# Patient Record
Sex: FEMALE | Race: WHITE | Employment: OTHER | URBAN - METROPOLITAN AREA
[De-identification: names, ages, dates, MRNs, and addresses within clinical notes are randomized per-mention and may not be internally consistent; named-entity substitution may affect disease eponyms.]

---

## 2023-09-12 NOTE — H&P
ASSESSMENT:  1. Closed fracture of distal end of left radius, unspecified fracture morphology, initial encounter          There is no problem list on file for this patient. PLAN:  Treatment Plan:  I recommend operative management. Open reduction internal fixation under regional anesthesia. We discussed reasonable expectations. We discussed usual postop course. She has given informed consent to proceed. Orders Placed This Encounter    Surgical Posting Sheet      Follow-up: Return for first postoperative visit. HISTORY OF PRESENT ILLNESS:  Chief Complaint: Injury of the Left Hand   Age: 80 y.o. Sex: female   History of present illness: Jigar Boyer  is a pleasant 80 y.o. female who presents today for evaluation of the left wrist.  She had a fall September 7th. She was found to have a widely displaced intra-articular fracture of the distal radius. She was seen in [place]. She had a closed reduction performed. She was placed into a splint. Past medical history, past surgical history, medications, allergies, social history, and review of systems have been reviewed by me. No current facility-administered medications on file prior to encounter. No current outpatient medications on file prior to encounter. Not on File    No past medical history on file.     Social History     Socioeconomic History    Marital status: Not on file     Spouse name: Not on file    Number of children: Not on file    Years of education: Not on file    Highest education level: Not on file   Occupational History    Not on file   Tobacco Use    Smoking status: Not on file    Smokeless tobacco: Not on file   Substance and Sexual Activity    Alcohol use: Not on file    Drug use: Not on file    Sexual activity: Not on file   Other Topics Concern    Not on file   Social History Narrative    Not on file     Social Determinants of Health     Financial Resource Strain: Not on file   Food Insecurity: Not on file   Transportation Needs: Not on file   Physical Activity: Not on file   Stress: Not on file   Social Connections: Not on file   Intimate Partner Violence: Not on file   Housing Stability: Not on file            Review of Systems   9/11/2023     Constitutional: Unexplained: Negative  Genitourinary: Frequent Urination: Negative  HEENT: Vision Loss: Negative  Neurological: Memory Loss: Negative  Integumentary: Rash: Negative  Cardiovascular: Palpatations: Negative  Hematologic: Bruises/Bleeds Easily: Positive  Gastrointestinal: Constipation: Positive  Immunological: Seasonal Allergies: Positive  Musculoskeletal: Joint Pain: Positive         OBJECTIVE:  Constitutional:  No acute distress. Pleasant and cooperative with exam.     Musculoskeletal       She has moderate swelling of the hand. She is in a well fitted splint. Has intact sensibility. Full digital motion. IMAGING / STUDIES:           No imaging obtained       I independently reviewed her x-ray images. She has a comminuted displaced intra-articular fracture of the distal radius.

## 2023-09-13 ENCOUNTER — ANESTHESIA (OUTPATIENT)
Facility: HOSPITAL | Age: 68
End: 2023-09-13
Payer: MEDICARE

## 2023-09-13 ENCOUNTER — ANESTHESIA EVENT (OUTPATIENT)
Facility: HOSPITAL | Age: 68
End: 2023-09-13
Payer: MEDICARE

## 2023-09-13 ENCOUNTER — HOSPITAL ENCOUNTER (OUTPATIENT)
Facility: HOSPITAL | Age: 68
Setting detail: OUTPATIENT SURGERY
Discharge: HOME | End: 2023-09-13
Attending: ORTHOPAEDIC SURGERY | Admitting: ORTHOPAEDIC SURGERY
Payer: MEDICARE

## 2023-09-13 VITALS
BODY MASS INDEX: 21.71 KG/M2 | TEMPERATURE: 97.6 F | DIASTOLIC BLOOD PRESSURE: 71 MMHG | SYSTOLIC BLOOD PRESSURE: 137 MMHG | WEIGHT: 115 LBS | HEART RATE: 79 BPM | OXYGEN SATURATION: 100 % | RESPIRATION RATE: 11 BRPM | HEIGHT: 61 IN

## 2023-09-13 DIAGNOSIS — S52.502A CLOSED FRACTURE OF DISTAL END OF LEFT RADIUS, UNSPECIFIED FRACTURE MORPHOLOGY, INITIAL ENCOUNTER: Primary | ICD-10-CM

## 2023-09-13 PROCEDURE — 2709999900 HC NON-CHARGEABLE SUPPLY: Performed by: ORTHOPAEDIC SURGERY

## 2023-09-13 PROCEDURE — 6360000002 HC RX W HCPCS: Performed by: ANESTHESIOLOGY

## 2023-09-13 PROCEDURE — 6360000002 HC RX W HCPCS: Performed by: NURSE ANESTHETIST, CERTIFIED REGISTERED

## 2023-09-13 PROCEDURE — 2500000003 HC RX 250 WO HCPCS: Performed by: NURSE ANESTHETIST, CERTIFIED REGISTERED

## 2023-09-13 PROCEDURE — C1713 ANCHOR/SCREW BN/BN,TIS/BN: HCPCS | Performed by: ORTHOPAEDIC SURGERY

## 2023-09-13 PROCEDURE — 6360000002 HC RX W HCPCS

## 2023-09-13 PROCEDURE — 7100000001 HC PACU RECOVERY - ADDTL 15 MIN: Performed by: ORTHOPAEDIC SURGERY

## 2023-09-13 PROCEDURE — 2580000003 HC RX 258: Performed by: NURSE ANESTHETIST, CERTIFIED REGISTERED

## 2023-09-13 PROCEDURE — 3600000004 HC SURGERY LEVEL 4 BASE: Performed by: ORTHOPAEDIC SURGERY

## 2023-09-13 PROCEDURE — 3700000000 HC ANESTHESIA ATTENDED CARE: Performed by: ORTHOPAEDIC SURGERY

## 2023-09-13 PROCEDURE — 2720000010 HC SURG SUPPLY STERILE: Performed by: ORTHOPAEDIC SURGERY

## 2023-09-13 PROCEDURE — 7100000000 HC PACU RECOVERY - FIRST 15 MIN: Performed by: ORTHOPAEDIC SURGERY

## 2023-09-13 PROCEDURE — 3600000014 HC SURGERY LEVEL 4 ADDTL 15MIN: Performed by: ORTHOPAEDIC SURGERY

## 2023-09-13 PROCEDURE — 2580000003 HC RX 258

## 2023-09-13 PROCEDURE — 64415 NJX AA&/STRD BRCH PLXS IMG: CPT | Performed by: ANESTHESIOLOGY

## 2023-09-13 PROCEDURE — 6360000002 HC RX W HCPCS: Performed by: ORTHOPAEDIC SURGERY

## 2023-09-13 PROCEDURE — 3700000001 HC ADD 15 MINUTES (ANESTHESIA): Performed by: ORTHOPAEDIC SURGERY

## 2023-09-13 DEVICE — PEG BONE FXTN L20MM D2.2MM DST VOLAR RDL SMOOTH LOK CRSSLCK: Type: IMPLANTABLE DEVICE | Site: ARM | Status: FUNCTIONAL

## 2023-09-13 DEVICE — SCREW BNE L12MM DIA2.7MM DST VOLAR RAD LOK FULL THRD SQ DRV: Type: IMPLANTABLE DEVICE | Site: ARM | Status: FUNCTIONAL

## 2023-09-13 DEVICE — SCREW BNE L14MM DIA2.7MM DST VOLAR RAD NONLOCKING FULL THRD: Type: IMPLANTABLE DEVICE | Site: ARM | Status: FUNCTIONAL

## 2023-09-13 DEVICE — PLATE BNE W24XL43MM 12 H L DST RAD TI LOK COMPR LO PROF NAR: Type: IMPLANTABLE DEVICE | Site: ARM | Status: FUNCTIONAL

## 2023-09-13 DEVICE — SCREW BNE L12MM DIA2.7MM DST RAD NONLOCKING FULL THRD SQ: Type: IMPLANTABLE DEVICE | Site: ARM | Status: FUNCTIONAL

## 2023-09-13 DEVICE — PEG BNE FIX L16MM DIA2.2MM DST VOLAR RAD SMOOTH LOK FOR DVR: Type: IMPLANTABLE DEVICE | Site: ARM | Status: FUNCTIONAL

## 2023-09-13 DEVICE — K WIRE FIX DIA1.6MM S STL FOR DST VOLAR PLATING SYS: Type: IMPLANTABLE DEVICE | Status: FUNCTIONAL

## 2023-09-13 DEVICE — SCREW BNE FIX L18MM DIA2.2MM DST VOLAR RAD SMOOTH LOK PEG: Type: IMPLANTABLE DEVICE | Site: ARM | Status: FUNCTIONAL

## 2023-09-13 RX ORDER — CETIRIZINE HYDROCHLORIDE 10 MG/1
10 TABLET ORAL DAILY
COMMUNITY

## 2023-09-13 RX ORDER — SODIUM CHLORIDE, SODIUM LACTATE, POTASSIUM CHLORIDE, CALCIUM CHLORIDE 600; 310; 30; 20 MG/100ML; MG/100ML; MG/100ML; MG/100ML
INJECTION, SOLUTION INTRAVENOUS CONTINUOUS PRN
Status: DISCONTINUED | OUTPATIENT
Start: 2023-09-13 | End: 2023-09-13 | Stop reason: SDUPTHER

## 2023-09-13 RX ORDER — LIDOCAINE HYDROCHLORIDE 20 MG/ML
INJECTION, SOLUTION EPIDURAL; INFILTRATION; INTRACAUDAL; PERINEURAL PRN
Status: DISCONTINUED | OUTPATIENT
Start: 2023-09-13 | End: 2023-09-13 | Stop reason: SDUPTHER

## 2023-09-13 RX ORDER — FENTANYL CITRATE 50 UG/ML
50 INJECTION, SOLUTION INTRAMUSCULAR; INTRAVENOUS ONCE
Status: COMPLETED | OUTPATIENT
Start: 2023-09-13 | End: 2023-09-13

## 2023-09-13 RX ORDER — MIDAZOLAM HYDROCHLORIDE 2 MG/2ML
2 INJECTION, SOLUTION INTRAMUSCULAR; INTRAVENOUS ONCE
Status: COMPLETED | OUTPATIENT
Start: 2023-09-13 | End: 2023-09-13

## 2023-09-13 RX ORDER — ROSUVASTATIN CALCIUM 5 MG/1
5 TABLET, COATED ORAL DAILY
COMMUNITY

## 2023-09-13 RX ORDER — HYDRALAZINE HYDROCHLORIDE 20 MG/ML
10 INJECTION INTRAMUSCULAR; INTRAVENOUS ONCE
Status: COMPLETED | OUTPATIENT
Start: 2023-09-13 | End: 2023-09-13

## 2023-09-13 RX ORDER — LETROZOLE 2.5 MG/1
2.5 TABLET, FILM COATED ORAL DAILY
COMMUNITY

## 2023-09-13 RX ORDER — ROPIVACAINE HYDROCHLORIDE 5 MG/ML
INJECTION, SOLUTION EPIDURAL; INFILTRATION; PERINEURAL
Status: COMPLETED | OUTPATIENT
Start: 2023-09-13 | End: 2023-09-13

## 2023-09-13 RX ORDER — MAGNESIUM 30 MG
250 TABLET ORAL 2 TIMES DAILY
COMMUNITY

## 2023-09-13 RX ORDER — OXYCODONE HYDROCHLORIDE AND ACETAMINOPHEN 5; 325 MG/1; MG/1
1 TABLET ORAL EVERY 4 HOURS PRN
Qty: 20 TABLET | Refills: 0 | Status: SHIPPED | OUTPATIENT
Start: 2023-09-13 | End: 2023-09-18

## 2023-09-13 RX ADMIN — MEPIVACAINE HYDROCHLORIDE 400 MG: 20 INJECTION, SOLUTION EPIDURAL; INFILTRATION at 09:15

## 2023-09-13 RX ADMIN — PROPOFOL 20 MG: 10 INJECTION, EMULSION INTRAVENOUS at 09:30

## 2023-09-13 RX ADMIN — MEPIVACAINE HYDROCHLORIDE 15 ML: 20 INJECTION, SOLUTION EPIDURAL; INFILTRATION at 09:00

## 2023-09-13 RX ADMIN — LIDOCAINE HYDROCHLORIDE 30 MG: 20 INJECTION, SOLUTION EPIDURAL; INFILTRATION; INTRACAUDAL; PERINEURAL at 09:31

## 2023-09-13 RX ADMIN — VANCOMYCIN HYDROCHLORIDE 1000 MG: 1 INJECTION, POWDER, LYOPHILIZED, FOR SOLUTION INTRAVENOUS at 09:31

## 2023-09-13 RX ADMIN — MIDAZOLAM HYDROCHLORIDE 2 MG: 1 INJECTION, SOLUTION INTRAMUSCULAR; INTRAVENOUS at 09:10

## 2023-09-13 RX ADMIN — ROPIVACAINE HYDROCHLORIDE 15 ML: 5 INJECTION, SOLUTION EPIDURAL; INFILTRATION; PERINEURAL at 09:00

## 2023-09-13 RX ADMIN — PROPOFOL 50 MCG/KG/MIN: 10 INJECTION, EMULSION INTRAVENOUS at 09:31

## 2023-09-13 RX ADMIN — SODIUM CHLORIDE, POTASSIUM CHLORIDE, SODIUM LACTATE AND CALCIUM CHLORIDE: 600; 310; 30; 20 INJECTION, SOLUTION INTRAVENOUS at 09:25

## 2023-09-13 RX ADMIN — FENTANYL CITRATE 50 MCG: 50 INJECTION, SOLUTION INTRAMUSCULAR; INTRAVENOUS at 09:08

## 2023-09-13 RX ADMIN — HYDRALAZINE HYDROCHLORIDE 10 MG: 20 INJECTION INTRAMUSCULAR; INTRAVENOUS at 10:55

## 2023-09-13 ASSESSMENT — PAIN - FUNCTIONAL ASSESSMENT: PAIN_FUNCTIONAL_ASSESSMENT: 0-10

## 2023-09-13 NOTE — OP NOTE
PREOPERATIVE DIAGNOSIS: Closed displaced left intraarticular distal radius fracture. POSTOPERATIVE DIAGNOSIS: Closed displaced left intraarticular distal radius fracture. PROCEDURES PERFORMED: Open reduction and internal fixation of left intraarticular distal radius fracture, 3 or more fragments     SURGEON: Isiah Mendoza MD    ASSISTANT: SHAUN Lunsford     ANESTHESIA: Regional.     ESTIMATED BLOOD LOSS: Minimal.     SPECIMENS REMOVED: None. COMPLICATIONS: None. IMPLANTS: DVR Crosslock volar locking distal radius plate. INDICATIONS FOR PROCEDURE: This is a 80year old female who   fell and sustained a left intraarticular distal radius fracture. She was indicated for operative fixation. We discussed risks, benefits, potential complications and alternatives to surgery, and the patient gave informed consent to proceed. DESCRIPTION OF PROCEDURE: The patient was identified in the   preoperative holding area. Informed consent was obtained. The   operative site was marked. Regional anesthesia was then established   by the anesthesia team. The patient was then transported to the OR and placed   supine on the operating table. All bony prominences were well-padded. A tourniquet was applied to the upper brachium. After induction of   deep sedation, the left upper extremity was sterilely prepped and   draped in the usual fashion. Surgical time-out was held, and the   operative site was confirmed. Preoperative antibiotics were used. After   Esmarch examination, the tourniquet was elevated to 250 mmHg. A longitudinal incision was made over the FCR tendon. Sharp   dissection was performed through skin and subcutaneous tissues. The   FCR was retracted ulnarly. The deep fascia was incised. The FPL was   swept off of the distal radius. The brachioradialis was released sharply   off of the radial styloid. The pronator was incised sharply off of the   radial margin of the radius.  The fracture was easily exposed. The   fracture was then reduced and held in place with provisional K-wire   fixation. The volar plate was then applied and held in place   provisionally with multiple K-wires. Fluoroscopy was brought in and   confirmed good reduction of the fracture and good alignment of the   hardware. Multiple locking pegs were placed distally. Multiple   locking and nonlocking screws were placed proximally. K-wires were   removed. The wrist and forearm were taken through a full range of   motion without crepitus. The wound was thoroughly irrigated. The pronator was closed with a 3-  0 Vicryl and the skin was closed with 3-0 Vicryl and 4-0 nylon. Sterile   dressings were applied. A splint was applied. The tourniquet was let   down and brisk capillary refill returned to the digits. The patient was awakened   from light sedation and transferred to the PACU in stable condition   without complication. SHAUN Ac assisted in this surgery by retracting soft tissues during the salient portions of the surgery, aiding in reduction of the fracture, and by finishing the closure of the wound.

## 2023-09-13 NOTE — PERIOP NOTE
Reviewed discharge instructions with patient's son, Tonya Macias, via phone. Tonya Macias verbalized understanding. Paper copy given to patient. No further questions at this time.

## 2023-09-13 NOTE — ANESTHESIA PROCEDURE NOTES
Peripheral Block    Patient location during procedure: pre-op  Reason for block: at surgeon's request  Start time: 9/13/2023 9:00 AM  End time: 9/13/2023 9:15 AM  Staffing  Performed: anesthesiologist   Anesthesiologist: Andrés Pearce MD  Performed by: Andrés Pearce MD  Authorized by: Andrés Pearce MD    Preanesthetic Checklist  Completed: patient identified, IV checked, site marked, risks and benefits discussed, surgical/procedural consents, equipment checked, pre-op evaluation, timeout performed, anesthesia consent given, oxygen available, monitors applied/VS acknowledged, fire risk safety assessment completed and verbalized and blood product R/B/A discussed and consented  Peripheral Block   Patient position: supine  Prep: ChloraPrep  Provider prep: sterile gloves  Patient monitoring: cardiac monitor, continuous pulse ox, frequent blood pressure checks, IV access, oxygen and responsive to questions  Block type: Brachial plexus  Supraclavicular  Laterality: left  Injection technique: single-shot  Guidance: ultrasound guided    Needle   Needle type: insulated echogenic nerve stimulator needle   Needle gauge: 20 G  Needle localization: ultrasound guidance  Needle length: 5 cm  Assessment   Injection assessment: negative aspiration for heme, no paresthesia on injection, local visualized surrounding nerve on ultrasound and no intravascular symptoms  Paresthesia pain: immediately resolved  Slow fractionated injection: yes  Hemodynamics: stableno  Outcomes: uncomplicated    Medications Administered  mepivacaine (CARBOCAINE) injection 2% - Perineural, Arm Left   15 mL - 9/13/2023 9:00:00 AM  ropivacaine (NAROPIN) injection 0.5% - Perineural, Arm Left   15 mL - 9/13/2023 9:00:00 AM

## 2023-09-13 NOTE — DISCHARGE INSTRUCTIONS
Dr. Nishant Borrego Upper Extremity Postoperative Instructions      1. Please maintain the dressing and splint placed at surgery until your follow up appointment where it will be removed. Please keep the dressing clean and dry. If you have any questions or problems, please call our office at [phone]. 2. Please elevate the operative extremity to the level of the heart to keep swelling at a minimum. You may get up to move around, but when seated please keep the extremity elevated as much as possible. This will decrease swelling and pain. 3. You were told to be non-weight bearing following surgery. Please do not lift anything heavier than 1 lb with your operative hand. 4. You may ice your Arm/Hand 5 times a day for 20 minutes at a time. 5. You had a block from the Anesthesiologist before surgery. Expect this to wear off around 12-24 hours after you received it. You should start taking your pain medication before the feeling begins to come back into your arm/ hand. 6. A prescription for pain medication is provided. The key to pain control is staying ahead of the pain. For the first 48-72 hours after your surgery, you may want to take your pain medication on a regular schedule. After that, you may only need it on an as needed basis. 7. If you experience any redness, increased pain, increased swelling not relieved by elevation, drainage, fever, or chills, please call the office at [phone]. Please Follow-up at my office [address] [facility], [address] in 2 weeks unless otherwise directed.

## 2025-06-02 ENCOUNTER — HOSPITAL ENCOUNTER (OUTPATIENT)
Age: 70
Discharge: HOME | End: 2025-06-05
Payer: MEDICARE

## 2025-06-02 DIAGNOSIS — M81.0 POST-MENOPAUSAL OSTEOPOROSIS: ICD-10-CM

## 2025-06-02 PROCEDURE — 77080 DXA BONE DENSITY AXIAL: CPT

## (undated) DEVICE — SUTURE ETHLN SZ 4-0 L18IN NONABSORBABLE BLK L19MM PS-2 3/8 1667H

## (undated) DEVICE — DRAPE C ARM W41XL59IN MINI ELAS OPN

## (undated) DEVICE — SPONGE GZ W4XL4IN COT 12 PLY TYP VII WVN C FLD DSGN STERILE

## (undated) DEVICE — BANDAGE COBAN 4 IN COMPR W4INXL5YD FOAM COHESIVE QUIK STK SELF ADH SFT

## (undated) DEVICE — SOLUTION IRRIG 1000ML 0.9% SOD CHL USP POUR PLAS BTL

## (undated) DEVICE — BANDAGE,ELASTIC,ESMARK,STERILE,4"X9',LF: Brand: MEDLINE

## (undated) DEVICE — DRAPE,HAND,STERILE: Brand: MEDLINE

## (undated) DEVICE — ASTOUND STANDARD SURGICAL GOWN, XXL: Brand: CONVERTORS

## (undated) DEVICE — GOWN,SIRUS,NONRNF,SETINSLV,XL,20/CS: Brand: MEDLINE

## (undated) DEVICE — MINOR BASIN -SMH: Brand: MEDLINE INDUSTRIES, INC.

## (undated) DEVICE — DISPOSABLE TOURNIQUET CUFF SINGLE BLADDER, DUAL PORT AND QUICK CONNECT CONNECTOR: Brand: COLOR CUFF

## (undated) DEVICE — BIT DRL DIA2.2MM CROSSLOCK MOD TY DVR ANAT VOLAR PLATING

## (undated) DEVICE — APPLICATOR MEDICATED 26 CC SOLUTION HI LT ORNG CHLORAPREP

## (undated) DEVICE — GLOVE SURG SZ 75 L12IN FNGR THK79MIL GRN LTX FREE

## (undated) DEVICE — PADDING CAST 4 INX5 YD STRL

## (undated) DEVICE — LIGHT HANDLE COVER ,2 PER PACK: Brand: DEROYAL

## (undated) DEVICE — DRAPE,REIN 53X77,STERILE: Brand: MEDLINE

## (undated) DEVICE — GLOVE SURG SZ 6 L12IN FNGR THK79MIL GRN LTX FREE

## (undated) DEVICE — SUTURE VCRL SZ 3-0 L27IN ABSRB UD L26MM SH 1/2 CIR J416H